# Patient Record
(demographics unavailable — no encounter records)

---

## 2025-01-03 NOTE — HEALTH RISK ASSESSMENT
[No] : In the past 12 months have you used drugs other than those required for medical reasons? No [0] : 2) Feeling down, depressed, or hopeless: Not at all (0) [PHQ-2 Negative - No further assessment needed] : PHQ-2 Negative - No further assessment needed [Never] : Never [HIV test declined] : HIV test declined [Hepatitis C test declined] : Hepatitis C test declined [de-identified] : runs 40-50 miles/week [de-identified] : relatively healthy, tries to get enough fruits/vegetables, does get processed foods and sweets in diet [[id number]] : 0 [ColonoscopyComments] : never had before

## 2025-01-03 NOTE — ASSESSMENT
[FreeTextEntry1] : # HM f/u AV labs Flu shot given today Due for colon ca screening, referred to GI  # Onychomycosis Prescribed terbinafine check CMP before starting, will recheck in 6 weeks after starting medication  # Hx iron deficiency f/u iron studies  # Alcoholism in recovery Hasn't had a drink in >10 years  f/u in 1 year or PRN

## 2025-01-03 NOTE — HISTORY OF PRESENT ILLNESS
[FreeTextEntry1] : CPE [de-identified] : Pt comes in for CPE.  Alcoholism in recovery: doing well, last drink in 2013.  Iron deficiency: not vegan/vegetarian.  Onychomycosis: never received terbinafine from pharmacy, still has issues with 1st toe nail both sides.

## 2025-01-03 NOTE — REVIEW OF SYSTEMS
[Negative] : Musculoskeletal [Dysuria] : no dysuria [Incontinence] : no incontinence [Nocturia] : no nocturia [Hematuria] : no hematuria [Frequency] : no frequency [Anxiety] : no anxiety [Depression] : no depression [de-identified] : thickened and dark first toe nails b/l

## 2025-01-03 NOTE — REVIEW OF SYSTEMS
[Negative] : Musculoskeletal [Dysuria] : no dysuria [Incontinence] : no incontinence [Nocturia] : no nocturia [Hematuria] : no hematuria [Frequency] : no frequency [Anxiety] : no anxiety [Depression] : no depression [de-identified] : thickened and dark first toe nails b/l

## 2025-01-03 NOTE — HEALTH RISK ASSESSMENT
[No] : In the past 12 months have you used drugs other than those required for medical reasons? No [0] : 2) Feeling down, depressed, or hopeless: Not at all (0) [PHQ-2 Negative - No further assessment needed] : PHQ-2 Negative - No further assessment needed [Never] : Never [HIV test declined] : HIV test declined [Hepatitis C test declined] : Hepatitis C test declined [de-identified] : runs 40-50 miles/week [de-identified] : relatively healthy, tries to get enough fruits/vegetables, does get processed foods and sweets in diet [[id number]] : 0 [ColonoscopyComments] : never had before

## 2025-01-28 NOTE — PHYSICAL EXAM
[Alert] : alert [Normal Voice/Communication] : normal voice/communication [Healthy Appearing] : healthy appearing [No Acute Distress] : no acute distress [Sclera] : the sclera and conjunctiva were normal [Hearing Threshold Finger Rub Not [place]] : hearing was normal [Normal Lips/Gums] : the lips and gums were normal [Oropharynx] : the oropharynx was normal [Normal Appearance] : the appearance of the neck was normal [No Neck Mass] : no neck mass was observed [No Respiratory Distress] : no respiratory distress [No Acc Muscle Use] : no accessory muscle use [Respiration, Rhythm And Depth] : normal respiratory rhythm and effort [Auscultation Breath Sounds / Voice Sounds] : lungs were clear to auscultation bilaterally [Heart Rate And Rhythm] : heart rate was normal and rhythm regular [Normal S1, S2] : normal S1 and S2 [Murmurs] : no murmurs [Bowel Sounds] : normal bowel sounds [Abdomen Tenderness] : non-tender [No Masses] : no abdominal mass palpated [Abdomen Soft] : soft [] : no hepatosplenomegaly [Oriented To Time, Place, And Person] : oriented to person, place, and time

## 2025-01-28 NOTE — ASSESSMENT
[FreeTextEntry1] : Colon cancer screening Average risk Prior colonoscopy: never Alarm symptoms: none  Plan: Patient scheduled for colonoscopy Preparation (suprep) sent to pharmacy and explained to patient All questions answered  Follow up at scheduled endoscopic procedure